# Patient Record
Sex: FEMALE | Race: OTHER | HISPANIC OR LATINO
[De-identification: names, ages, dates, MRNs, and addresses within clinical notes are randomized per-mention and may not be internally consistent; named-entity substitution may affect disease eponyms.]

---
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
## 2024-02-07 PROBLEM — Z00.00 ENCOUNTER FOR PREVENTIVE HEALTH EXAMINATION: Status: ACTIVE | Noted: 2024-02-07

## 2024-02-14 ENCOUNTER — APPOINTMENT (OUTPATIENT)
Dept: CARDIOLOGY | Facility: CLINIC | Age: 19
End: 2024-02-14

## 2024-02-22 ENCOUNTER — NON-APPOINTMENT (OUTPATIENT)
Age: 19
End: 2024-02-22

## 2024-02-22 ENCOUNTER — APPOINTMENT (OUTPATIENT)
Dept: CARDIOLOGY | Facility: CLINIC | Age: 19
End: 2024-02-22
Payer: MEDICAID

## 2024-02-22 VITALS
DIASTOLIC BLOOD PRESSURE: 80 MMHG | HEIGHT: 61.81 IN | WEIGHT: 130 LBS | BODY MASS INDEX: 23.92 KG/M2 | HEART RATE: 87 BPM | OXYGEN SATURATION: 100 % | SYSTOLIC BLOOD PRESSURE: 120 MMHG

## 2024-02-22 DIAGNOSIS — Q21.0 VENTRICULAR SEPTAL DEFECT: ICD-10-CM

## 2024-02-22 PROCEDURE — 93000 ELECTROCARDIOGRAM COMPLETE: CPT

## 2024-02-22 PROCEDURE — 99213 OFFICE O/P EST LOW 20 MIN: CPT

## 2024-02-22 RX ORDER — PEDI MULTIVIT NO.17 W-FLUORIDE 1 MG
1 TABLET,CHEWABLE ORAL
Refills: 0 | Status: DISCONTINUED | COMMUNITY
End: 2024-02-22

## 2024-02-22 NOTE — ASSESSMENT
[FreeTextEntry1] : Echo: 3/30/2022. Small muscular ventricular septal defect, left to right shunt. Otherwise normal intracardiac anatomy. LV dimensions and shortening fraction were normal. No pericardial effusion.   EKG 2/22/2024- Sinus Rhythm  -Negative precordial T-waves -Normal for age.PROBABLY NORMAL FOR AGE  Assessment: 1.  Small restrictive muscular VSD.- Patient is asymptomatic.  Recommendations: Patient referred to adult congenital heart disease clinic at [facility] for continued monitoring and f/u. Patient and mother were agreeable.

## 2024-02-22 NOTE — PHYSICAL EXAM
[Well Developed] : well developed [Well Nourished] : well nourished [No Acute Distress] : no acute distress [Normal Conjunctiva] : normal conjunctiva [Normal Venous Pressure] : normal venous pressure [No Carotid Bruit] : no carotid bruit [Normal S1, S2] : normal S1, S2 [No Murmur] : no murmur [No Rub] : no rub [No Gallop] : no gallop [Clear Lung Fields] : clear lung fields [Good Air Entry] : good air entry [No Respiratory Distress] : no respiratory distress  [Soft] : abdomen soft [Non Tender] : non-tender [No Masses/organomegaly] : no masses/organomegaly [Normal Gait] : normal gait [Normal Bowel Sounds] : normal bowel sounds [No Edema] : no edema [No Clubbing] : no clubbing [No Cyanosis] : no cyanosis [No Varicosities] : no varicosities [No Rash] : no rash [No Skin Lesions] : no skin lesions [Moves all extremities] : moves all extremities [No Focal Deficits] : no focal deficits [Normal Speech] : normal speech [Alert and Oriented] : alert and oriented [Normal memory] : normal memory [de-identified] : Chaperone:Liliana Lucio MA

## 2024-02-22 NOTE — HISTORY OF PRESENT ILLNESS
[FreeTextEntry1] : : Ca line solutions: ID number [id number]: Faroese: Rosi  Patient is a 18-year-old  female with a history of known small restrictive muscular VSD, patient was followed in pediatric cardiology office at [facility]. During her prior pediatric cardiology follow-up patient has been educated regarding increased risk of endocarditis. SBE Prophylaxis: FARA does not need bacterial endocarditis prophylaxis.    Today patient presents for follow-up for VSD.  Since her last visit with the pediatric cardiologist patient has not had any hospital admissions.  Patient is physically active denies any exertional chest pain dyspnea palpitations.  Family cardiac history: Noncontributory.

## 2024-03-04 ENCOUNTER — APPOINTMENT (OUTPATIENT)
Dept: DERMATOLOGY | Facility: CLINIC | Age: 19
End: 2024-03-04
Payer: COMMERCIAL

## 2024-03-04 PROCEDURE — 99202 OFFICE O/P NEW SF 15 MIN: CPT

## 2024-03-12 DIAGNOSIS — Q21.0 VENTRICULAR SEPTAL DEFECT: ICD-10-CM

## 2024-03-17 ENCOUNTER — RESULT CHARGE (OUTPATIENT)
Age: 19
End: 2024-03-17

## 2024-03-18 ENCOUNTER — APPOINTMENT (OUTPATIENT)
Dept: PEDIATRIC CARDIOLOGY | Facility: CLINIC | Age: 19
End: 2024-03-18
Payer: COMMERCIAL

## 2024-03-18 VITALS
DIASTOLIC BLOOD PRESSURE: 76 MMHG | BODY MASS INDEX: 23.87 KG/M2 | RESPIRATION RATE: 18 BRPM | HEART RATE: 74 BPM | SYSTOLIC BLOOD PRESSURE: 113 MMHG | HEIGHT: 62.4 IN | OXYGEN SATURATION: 100 % | WEIGHT: 131.4 LBS

## 2024-03-18 DIAGNOSIS — Z78.9 OTHER SPECIFIED HEALTH STATUS: ICD-10-CM

## 2024-03-18 DIAGNOSIS — Z87.74 PERSONAL HISTORY OF (CORRECTED) CONGENITAL MALFORMATIONS OF HEART AND CIRCULATORY SYSTEM: ICD-10-CM

## 2024-03-18 PROCEDURE — 93303 ECHO TRANSTHORACIC: CPT

## 2024-03-18 PROCEDURE — G2211 COMPLEX E/M VISIT ADD ON: CPT | Mod: NC,1L

## 2024-03-18 PROCEDURE — 99204 OFFICE O/P NEW MOD 45 MIN: CPT | Mod: 25

## 2024-03-18 RX ORDER — AMOXICILLIN 500 MG/1
500 CAPSULE ORAL
Qty: 4 | Refills: 4 | Status: ACTIVE | COMMUNITY
Start: 2024-03-18 | End: 1900-01-01

## 2024-03-18 NOTE — CONSULT LETTER
[Sincerely,] : Sincerely, [Today's Date] : [unfilled] [Name] : Name: [unfilled] [] : : ~~ [Today's Date:] : [unfilled] [Consult] : I had the pleasure of evaluating your patient, [unfilled]. My full evaluation follows. [Consult - Single Provider] : Thank you very much for allowing me to participate in the care of this patient. If you have any questions, please do not hesitate to contact me. [de-identified] : Melvina Landaverde, MSN, CPNP-AC, [facility] Pediatric Cardiology, Adult Congenital Cardiology [facility] Physician [facility]  Nely Chand MD, MARGAUX Director, Adult Congenital Heart , High Risk Cardiovascular Obstetrics [facility] Physician [facility]  [address]: [phone] [facility] Office: [phone] [facility] Office: [phone]

## 2024-03-18 NOTE — REASON FOR VISIT
[Initial Consultation] : an initial consultation for [Ventricular Septal Defect] : a ventricular septal defect [Patient] : patient [Other: ______] : provided by BARBIE [Interpreters_IDNumber] : [id number] [FreeTextEntry3] : restrictive VSD [TWNoteComboBox1] : Egyptian

## 2024-03-18 NOTE — HISTORY OF PRESENT ILLNESS
[FreeTextEntry1] : We had the pleasure of seeing ALYSSA SCHERER for evaluation today in the Adult Congenital Heart Program at [facility]. Alyssa is a 18 year old with restrictive VSD, diagnosed at age 16 by Dr. Madhu Gutierrez following referral for chest pain. She was most recently seen by adult cardiology who referred her back to us.  She was admitted to the hospital at 6 years of age for UTI. She was started on antibiotics and treated for 15 days . An echocardiogram was performed due to a murmur and mom was told that they want to make sure that she "does not develop an infection in her heart". Alyssa was discharged home after 2 weeks. She was on oral antibiotics during the hospital admission. She does not believe that Alyssa ever had endocarditis.  She complains of intermittent palpitations while when she is at work.  She denies chest pain, shortness of breath, peripheral edema, dizziness or syncope.  She can walk up a flight of stairs.   She had a prior holter for palpitations which was negative.   She does not use antibiotic prophylaxis.    She uses condoms for birth control.

## 2024-03-18 NOTE — PHYSICAL EXAM
[General Appearance - Alert] : alert [General Appearance - Well Nourished] : well nourished [General Appearance - Well-Appearing] : well appearing [Auscultation Breath Sounds / Voice Sounds] : breath sounds clear to auscultation bilaterally [Respiration, Rhythm And Depth] : normal respiratory rhythm and effort [Chest Visual Inspection Thoracic Deformity] : no chest wall deformity [Heart Sounds] : normal S1 and S2 [Abdomen Soft] : soft [Nail Clubbing] : no clubbing  or cyanosis of the fingernails [FreeTextEntry7] : HODA

## 2024-03-18 NOTE — CARDIOLOGY SUMMARY
[Today's Date] : [unfilled] [FreeTextEntry1] : NSR, ventricular rate 71 bpm [FreeTextEntry2] : Summary: 1. {S,D,S\\} Situs solitus, D-ventricular looping, normally related great arteries. 2. Trivial mitral valve regurgitation. 3. Small, restrictive, apical muscular ventricular septal defect, with left to right systolic interventricular shunt. 4. Normal right ventricular morphology with qualitatively normal size and systolic function. 5. Normal left ventricular size, morphology and systolic function. 6. No pericardial effusion.